# Patient Record
(demographics unavailable — no encounter records)

---

## 2017-06-13 NOTE — RAD
CHEST

6/13/17

 

PA and lateral views are compared with a 2/27/17 study. 

 

The heart remains normal in size and the lungs are clear. No acute infiltrate or effusion was seen. 
 The trachea is midline.

 

IMPRESSION:  

Stable exam showing no acute finding. 

 

POS: HOME

## 2019-04-22 NOTE — RAD
LEFT SHOULDER THREE VIEWS:

 

Date: 4-22-19

 

FINDINGS: 

No fracture, dislocation, or AC joint widening was seen. There is no periarticular calcification. 

 

IMPRESSION: 

No significant findings. 

 

POS: HOME

## 2019-04-22 NOTE — RAD
CHEST TWO VIEWS:

 

Date: 4-22-19

 

Comparison: 6-13-17

 

FINDINGS: 

The heart is normal in size and the lungs are clear. No infiltrate or effusion is seen. There is no c
ongestive change or other cause for dyspnea. 

 

IMPRESSION: 

No acute findings. 

 

POS: HOME